# Patient Record
Sex: MALE | Race: WHITE
[De-identification: names, ages, dates, MRNs, and addresses within clinical notes are randomized per-mention and may not be internally consistent; named-entity substitution may affect disease eponyms.]

---

## 2018-11-20 ENCOUNTER — HOSPITAL ENCOUNTER (OUTPATIENT)
Dept: HOSPITAL 47 - RADUSWWP | Age: 78
End: 2018-11-20
Attending: FAMILY MEDICINE
Payer: MEDICARE

## 2018-11-20 DIAGNOSIS — R60.0: Primary | ICD-10-CM

## 2018-11-20 PROCEDURE — 93970 EXTREMITY STUDY: CPT

## 2018-11-20 NOTE — US
EXAMINATION TYPE: US venous doppler duplex LE 

 

DATE OF EXAM: 11/20/2018 12:52 PM

 

COMPARISON: NONE

 

CLINICAL HISTORY: R60.0 Localized edema. Intermittent pain left thigh. Bilateral leg edema 

 

SIDE PERFORMED: Bilateral  

 

TECHNIQUE:  The lower extremity deep venous system is examined utilizing real time linear array sonog
dariela with graded compression, doppler sonography and color-flow sonography.

 

VESSELS IMAGED:

External Iliac Vein (EIV)

Common Femoral Vein

Deep Femoral Vein

Greater Saphenous Vein *

Femoral Vein

Popliteal Vein

Small Saphenous Vein *

Proximal Calf Veins

(* superficial vessels)

 

Grayscale, color doppler, spectral doppler imaging performed of the deep veins of the lower extremiti
es.  There is normal flow, compressibility, vascular waveforms.

 

Right Leg:  No evidence of DVT

 

Left Leg:  No evidence of DVT

 

 

 

IMPRESSION:  No sonographic evidence of deep venous thrombosis within either lower extremity.

## 2020-06-22 ENCOUNTER — APPOINTMENT (OUTPATIENT)
Dept: URBAN - METROPOLITAN AREA CLINIC 234 | Age: 80
Setting detail: DERMATOLOGY
End: 2020-06-22

## 2020-06-22 DIAGNOSIS — L28.0 LICHEN SIMPLEX CHRONICUS: ICD-10-CM

## 2020-06-22 PROCEDURE — OTHER PRESCRIPTION: OTHER

## 2020-06-22 PROCEDURE — 99201: CPT

## 2020-06-22 PROCEDURE — OTHER COUNSELING: OTHER

## 2020-06-22 PROCEDURE — OTHER RECOMMENDATIONS: OTHER

## 2020-06-22 RX ORDER — BETAMETHASONE DIPROPIONATE 0.5 MG/G
CREAM TOPICAL
Qty: 1 | Refills: 1 | Status: ERX | COMMUNITY
Start: 2020-06-22

## 2020-06-22 NOTE — PROCEDURE: RECOMMENDATIONS
Recommendation Preamble: The following recommendations were made during the visit:
Recommendations (Free Text): Otc Sarna lotion
Detail Level: Simple

## 2020-07-27 ENCOUNTER — APPOINTMENT (OUTPATIENT)
Dept: URBAN - METROPOLITAN AREA CLINIC 234 | Age: 80
Setting detail: DERMATOLOGY
End: 2020-07-27

## 2020-07-27 DIAGNOSIS — D69.2 OTHER NONTHROMBOCYTOPENIC PURPURA: ICD-10-CM

## 2020-07-27 DIAGNOSIS — L28.0 LICHEN SIMPLEX CHRONICUS: ICD-10-CM

## 2020-07-27 PROCEDURE — OTHER COUNSELING: OTHER

## 2020-07-27 PROCEDURE — 99213 OFFICE O/P EST LOW 20 MIN: CPT

## 2020-07-27 ASSESSMENT — LOCATION ZONE DERM
LOCATION ZONE: LEG
LOCATION ZONE: ARM

## 2020-07-27 ASSESSMENT — LOCATION SIMPLE DESCRIPTION DERM
LOCATION SIMPLE: LEFT FOREARM
LOCATION SIMPLE: RIGHT THIGH

## 2020-07-27 ASSESSMENT — LOCATION DETAILED DESCRIPTION DERM
LOCATION DETAILED: LEFT PROXIMAL DORSAL FOREARM
LOCATION DETAILED: RIGHT ANTERIOR PROXIMAL THIGH

## 2021-06-01 ENCOUNTER — HOSPITAL ENCOUNTER (OUTPATIENT)
Dept: HOSPITAL 47 - RADUSWWP | Age: 81
Discharge: HOME | End: 2021-06-01
Attending: INTERNAL MEDICINE
Payer: MEDICARE

## 2021-06-01 DIAGNOSIS — N18.4: Primary | ICD-10-CM

## 2021-06-01 PROCEDURE — 76770 US EXAM ABDO BACK WALL COMP: CPT

## 2021-06-01 NOTE — US
EXAMINATION TYPE: US kidneys/renal and bladder

 

DATE OF EXAM: 6/1/2021

 

COMPARISON: NONE

 

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 CKD, no symptoms

 

EXAM MEASUREMENTS:

 

Right Kidney:  9.9 x 4.8 x 6.1 cm

Left Kidney: 10.2 x 4.4 x 5.3 cm

 

 

Right Kidney: cortical thinning noted  

Left Kidney: cortical thinning noted    

Bladder: wnl

**Bilateral Jets seen: left . Right ureteral jet is not visualized.

 

 

No hydronephrosis or shadowing renal calculi.

 

IMPRESSION:

 

1. No hydronephrosis or shadowing renal calculi.

2. Bilateral cortical renal thinning.

3. Only left ureteral jet is visualized. The right ureteral jet is not visualized.

## 2022-03-15 ENCOUNTER — HOSPITAL ENCOUNTER (OUTPATIENT)
Dept: HOSPITAL 47 - RADXRMAIN | Age: 82
Discharge: HOME | End: 2022-03-15
Payer: MEDICARE

## 2022-03-15 DIAGNOSIS — M43.16: ICD-10-CM

## 2022-03-15 DIAGNOSIS — M25.761: ICD-10-CM

## 2022-03-15 DIAGNOSIS — M25.861: Primary | ICD-10-CM

## 2022-03-15 DIAGNOSIS — M47.26: ICD-10-CM

## 2022-03-15 DIAGNOSIS — M51.16: ICD-10-CM

## 2022-03-15 PROCEDURE — 72100 X-RAY EXAM L-S SPINE 2/3 VWS: CPT

## 2022-03-15 NOTE — XR
EXAMINATION TYPE: XR knee complete bilateral

 

DATE OF EXAM: 3/15/2022

 

CLINICAL HISTORY: Injury one year ago with pain and numbness

 

TECHNIQUE:  Three views of the right knee are obtained.

 

COMPARISON: None.

 

FINDINGS:  There is no acute fracture/dislocation evident in right knee. Moderate narrowing and spurr
ing medial tibiofemoral compartment. Mild to moderate narrowing and mild spurring patellofemoral comp
artment. Overlying soft tissue is unremarkable.

 

IMPRESSION: As above.

## 2022-03-15 NOTE — XR
EXAMINATION TYPE: XR lumbar spine 3V

 

DATE OF EXAM: 3/15/2022

 

Comparison: None

 

Clinical History: 81-year-old male M54.16, M25.361

 

Findings:

5 lumbar type vertebral bodies. Hypertrophic facet arthropathy throughout the lumbar spine. Mild mult
ilevel degenerative disc disease, more moderate at T12-L1. Bridging or nearly bridging anterior plate
 spondylosis L2-L4 levels. There is trace grade 1 retrolisthesis L3-L4 and L4-L5. Remaining alignment
 is maintained. Vertebral body heights are preserved.

 

 

Impression:

 

1. Hypertrophic facet arthropathy throughout. Trace grade 1 retrolisthesis L3-L4 and L4-L5.

2. Bridging or nearly bridging anterior endplate spondylosis L2 through L4 levels.

3. Multilevel mild degenerative disc disease. No vertebral compression collapse.

## 2022-10-25 ENCOUNTER — HOSPITAL ENCOUNTER (OUTPATIENT)
Dept: HOSPITAL 47 - EC | Age: 82
Setting detail: OBSERVATION
LOS: 1 days | Discharge: LEFT BEFORE DISCHARGE | End: 2022-10-26
Attending: HOSPITALIST | Admitting: HOSPITALIST
Payer: MEDICARE

## 2022-10-25 ENCOUNTER — HOSPITAL ENCOUNTER (OUTPATIENT)
Dept: HOSPITAL 47 - RADXRMAIN | Age: 82
Discharge: HOME | End: 2022-10-25
Payer: MEDICARE

## 2022-10-25 VITALS — TEMPERATURE: 97.9 F

## 2022-10-25 DIAGNOSIS — M50.30: Primary | ICD-10-CM

## 2022-10-25 DIAGNOSIS — M43.16: ICD-10-CM

## 2022-10-25 DIAGNOSIS — Z79.4: ICD-10-CM

## 2022-10-25 DIAGNOSIS — N17.9: ICD-10-CM

## 2022-10-25 DIAGNOSIS — E11.9: ICD-10-CM

## 2022-10-25 DIAGNOSIS — M51.34: ICD-10-CM

## 2022-10-25 DIAGNOSIS — E78.5: ICD-10-CM

## 2022-10-25 DIAGNOSIS — M51.36: ICD-10-CM

## 2022-10-25 DIAGNOSIS — Z53.29: ICD-10-CM

## 2022-10-25 DIAGNOSIS — Z20.822: ICD-10-CM

## 2022-10-25 DIAGNOSIS — Z79.82: ICD-10-CM

## 2022-10-25 DIAGNOSIS — R55: Primary | ICD-10-CM

## 2022-10-25 DIAGNOSIS — I10: ICD-10-CM

## 2022-10-25 DIAGNOSIS — I95.9: ICD-10-CM

## 2022-10-25 DIAGNOSIS — Z87.891: ICD-10-CM

## 2022-10-25 DIAGNOSIS — E86.0: ICD-10-CM

## 2022-10-25 DIAGNOSIS — Z79.899: ICD-10-CM

## 2022-10-25 DIAGNOSIS — Z79.890: ICD-10-CM

## 2022-10-25 DIAGNOSIS — K21.9: ICD-10-CM

## 2022-10-25 LAB
ANION GAP SERPL CALC-SCNC: 16 MMOL/L
BASOPHILS # BLD AUTO: 0 K/UL (ref 0–0.2)
BASOPHILS NFR BLD AUTO: 0 %
BUN SERPL-SCNC: 71 MG/DL (ref 9–20)
CALCIUM SPEC-MCNC: 8.5 MG/DL (ref 8.4–10.2)
CHLORIDE SERPL-SCNC: 102 MMOL/L (ref 98–107)
CO2 SERPL-SCNC: 23 MMOL/L (ref 22–30)
EOSINOPHIL # BLD AUTO: 0.3 K/UL (ref 0–0.7)
EOSINOPHIL NFR BLD AUTO: 5 %
ERYTHROCYTE [DISTWIDTH] IN BLOOD BY AUTOMATED COUNT: 3.97 M/UL (ref 4.3–5.9)
ERYTHROCYTE [DISTWIDTH] IN BLOOD: 13.8 % (ref 11.5–15.5)
GLUCOSE SERPL-MCNC: 138 MG/DL (ref 74–99)
HCT VFR BLD AUTO: 36.9 % (ref 39–53)
HGB BLD-MCNC: 12.9 GM/DL (ref 13–17.5)
LYMPHOCYTES # SPEC AUTO: 0.8 K/UL (ref 1–4.8)
LYMPHOCYTES NFR SPEC AUTO: 13 %
MCH RBC QN AUTO: 32.4 PG (ref 25–35)
MCHC RBC AUTO-ENTMCNC: 34.9 G/DL (ref 31–37)
MCV RBC AUTO: 92.8 FL (ref 80–100)
MONOCYTES # BLD AUTO: 0.3 K/UL (ref 0–1)
MONOCYTES NFR BLD AUTO: 5 %
NEUTROPHILS # BLD AUTO: 4.6 K/UL (ref 1.3–7.7)
NEUTROPHILS NFR BLD AUTO: 76 %
PLATELET # BLD AUTO: 101 K/UL (ref 150–450)
POTASSIUM SERPL-SCNC: 5 MMOL/L (ref 3.5–5.1)
SODIUM SERPL-SCNC: 141 MMOL/L (ref 137–145)
WBC # BLD AUTO: 6 K/UL (ref 3.8–10.6)

## 2022-10-25 PROCEDURE — 93005 ELECTROCARDIOGRAM TRACING: CPT

## 2022-10-25 PROCEDURE — 85025 COMPLETE CBC W/AUTO DIFF WBC: CPT

## 2022-10-25 PROCEDURE — 72072 X-RAY EXAM THORAC SPINE 3VWS: CPT

## 2022-10-25 PROCEDURE — 99285 EMERGENCY DEPT VISIT HI MDM: CPT

## 2022-10-25 PROCEDURE — 72100 X-RAY EXAM L-S SPINE 2/3 VWS: CPT

## 2022-10-25 PROCEDURE — 96360 HYDRATION IV INFUSION INIT: CPT

## 2022-10-25 PROCEDURE — 83036 HEMOGLOBIN GLYCOSYLATED A1C: CPT

## 2022-10-25 PROCEDURE — 72050 X-RAY EXAM NECK SPINE 4/5VWS: CPT

## 2022-10-25 PROCEDURE — 80048 BASIC METABOLIC PNL TOTAL CA: CPT

## 2022-10-25 PROCEDURE — 87636 SARSCOV2 & INF A&B AMP PRB: CPT

## 2022-10-25 PROCEDURE — 36415 COLL VENOUS BLD VENIPUNCTURE: CPT

## 2022-10-25 RX ADMIN — CEFAZOLIN SCH MLS/HR: 330 INJECTION, POWDER, FOR SOLUTION INTRAMUSCULAR; INTRAVENOUS at 19:34

## 2022-10-25 NOTE — XR
EXAMINATION TYPE: XR thoracic spine complete

 

DATE OF EXAM: 10/25/2022 2:45 PM

 

INDICATION: 

Patient age:Male;  81 years old; 

Reason for study: M51.35 Other intervertebral disc degeneration; PHH. 

 

COMPARISON: None

 

TECHNIQUE: 3 views of the thoracic spine in frontal, lateral, swimmer's projections. 

 

FINDINGS: 

No evidence of acute fracture.  Minimal dextrocurvature of the upper thoracic spine. No vertebral bod
y height loss. Multilevel degenerative changes of the visualized spine with disc, endplate sclerosis,
 and anterior osteophytosis. No spondylolisthesis.

 

IMPRESSION: 

1.  No acute osseous pathology.

2.  Mild degenerative disc disease.

## 2022-10-25 NOTE — ED
General Adult HPI





- General


Chief complaint: Dizziness


Stated complaint: syncope


Time Seen by Provider: 10/25/22 14:57


Source: patient, RN notes reviewed


Mode of arrival: EMS


Limitations: no limitations





- History of Present Illness


Initial comments: 








Dictation was produced using dragon dictation software. please excuse any 

grammatical, word or spelling errors. 











Chief Complaint: 81-year-old male presents to emergency department after 

presyncope





History of Present Illness: Patient is an 81-year-old diabetic male presents 

emergency department for episode of presyncope.  Patient had x-rays ordered by 

his primary care doctor in order to qualify for spinal injections to be 

performed by pain specialist.  Patient states he felt fine all day.  He lay down

for the x-rays.  He stood up rather quickly and started to feel little 

lightheaded.  X-ray techs allegedly witnessed the eventthat his blood pressure 

dropped slightly.  Patient denies any loss of consciousness.  He did have some 

episodes of nausea vomiting.  Patient states that since being in emergency 

department he feels fine.  His metabolic baseline.  His caretakers at the 

bedside states that patient's family member tested positive for COVID-19 

recently.  Patient denies any history of cardiac myopathy or cardiac disease.








The ROS documented in this emergency department record has been reviewed and 

confirmed by me.  Those systems with pertinent positive or negative responses 

have been documented in the HPI.  All other systems are other negative and/or no

ncontributory.








PHYSICAL EXAM:


General Impression: Alert and oriented x3, not in acute distress


HEENT: Normocephalic atraumatic, extra-ocular movements intact, pupils equal and

reactive to light bilaterally, mucous membranes moist.


Cardiovascular: Heart regular rate and rhythm


Chest: Able to complete full sentences, no retractions, no tachypnea


Abdomen: abdomen soft, non-tender, non-distended, no organomegaly


Musculoskeletal: Pulses present and equal in all extremities, no peripheral 

edema


Motor:  no focal deficits noted


Neurological: CN II-XII grossly intact, no focal motor or sensory deficits noted


Skin: Intact with no visualized rashes


Psych: Normal affect and mood





ED course: 81-year-old male presents emergency department for presyncopal 

episode.  Vital signs upon arrival are within acceptable limits.  Patient does 

however have a soft blood pressure for someone who has hypertension and takes 

multiple antihypertensive medications.  He is unremarkable.





Laboratory evaluation obtained.  CBC unremarkable.  Metabolic panel shows 

creatinine 3.7 with a BUN of 71.  Clinical presentation concerning for acute 

kidney injury for panel viral PCR is negative.  Patient observed in emergency 

Department with improvement of blood pressure.  Disposition options were 

discussed with patient.  Patient is agreeable for observation admission for IV 

fluids.  Patient's acute kidney injury, presyncope and hypotension likely 

secondary to dehydration.  Patient's son at the bedside states that patient has 

poor oral intake since the passing of their family member.  





EKG interpretation: Ventricular rate 52, sinus bradycardia, WA interval 217, QS 

119, QTC 39. No WA prolongation, no QTC prolongation, no ST or T-wave changes 

noted.   Overall, this EKG is unremarkable











- Related Data


                                Home Medications











 Medication  Instructions  Recorded  Confirmed


 


Labetalol [Trandate] 200 mg PO BID 08/02/18 10/25/22


 


Levothyroxine Sodium [Tirosint] 125 mcg PO DAILY 08/02/18 10/25/22


 


Omeprazole 20 mg PO DAILY 08/02/18 10/25/22


 


Torsemide [Demadex] 20 mg PO BID 08/02/18 10/25/22


 


calcitrioL [Rocaltrol] 0.25 mcg PO MOTUWETHFRSA 08/02/18 10/25/22


 


Aspirin EC [Ecotrin Low Dose] 81 mg PO DAILY 10/25/22 10/25/22


 


Imiquimod [Aldara] 1 packet TOPICAL Q72H 10/25/22 10/25/22


 


Insulin Aspart [NovoLOG Flexpen] 3 units SQ TID-W/MEALS PRN 10/25/22 10/25/22


 


Insulin Aspart [NovoLOG Flexpen] See Protocol SQ TID-W/MEALS PRN 10/25/22 

10/25/22


 


Insulin Glargine,Hum.rec.anlog 10 units SQ DAILY PRN 10/25/22 10/25/22





[Lantus Solostar Pen]   


 


Latanoprost [Latanoprost 0.005%] 1 drop BOTH EYES HS 10/25/22 10/25/22


 


Multivitamins, Thera [Multivitamin 1 tab PO DAILY 10/25/22 10/25/22





(formulary)]   


 


NIFEdipine XL [Procardia XL] 60 mg PO DAILY 10/25/22 10/25/22


 


Omega-3/Dha/Epa/Fish Oil [Fish Oil 1 cap PO DAILY 10/25/22 10/25/22





1,000 mg Softgel]   


 


Sodium Bicarbonate Tab 650 mg PO TID 10/25/22 10/25/22


 


Terazosin HCl 10 mg PO HS 10/25/22 10/25/22


 


lisinopriL [Zestril] 5 mg PO BID 10/25/22 10/25/22











                                    Allergies











Allergy/AdvReac Type Severity Reaction Status Date / Time


 


No Known Allergies Allergy   Verified 10/25/22 16:17














Review of Systems


ROS Statement: 


Those systems with pertinent positive or pertinent negative responses have been 

documented in the HPI.





ROS Other: All systems not noted in ROS Statement are negative.





Past Medical History


Past Medical History: Diabetes Mellitus, GERD/Reflux, Hyperlipidemia, 

Hypertension, Osteoarthritis (OA), Prostate Disorder, Renal Disease


Additional Past Medical History / Comment(s): Thrombocytopenic disorder


History of Any Multi-Drug Resistant Organisms: None Reported


Additional Past Surgical History / Comment(s): skin biopsy left shoulder


Past Anesthesia/Blood Transfusion Reactions: No Reported Reaction


Past Psychological History: No Psychological Hx Reported


Smoking Status: Former smoker


Past Alcohol Use History: Occasional


Past Drug Use History: None Reported





General Exam


Limitations: no limitations





Course


                                   Vital Signs











  10/25/22 10/25/22





  14:45 16:02


 


Temperature 97.9 F 


 


Pulse Rate 58 L 59 L


 


Respiratory 18 18





Rate  


 


Blood Pressure 92/49 124/49


 


O2 Sat by Pulse 98 96





Oximetry  














Medical Decision Making





- Lab Data


Result diagrams: 


                                 10/25/22 15:17





                                 10/25/22 15:17


                                   Lab Results











  10/25/22 10/25/22 10/25/22 Range/Units





  15:17 15:17 15:28 


 


WBC  6.0    (3.8-10.6)  k/uL


 


RBC  3.97 L    (4.30-5.90)  m/uL


 


Hgb  12.9 L    (13.0-17.5)  gm/dL


 


Hct  36.9 L    (39.0-53.0)  %


 


MCV  92.8    (80.0-100.0)  fL


 


MCH  32.4    (25.0-35.0)  pg


 


MCHC  34.9    (31.0-37.0)  g/dL


 


RDW  13.8    (11.5-15.5)  %


 


Plt Count  101 L    (150-450)  k/uL


 


MPV  9.4    


 


Neutrophils %  76    %


 


Lymphocytes %  13    %


 


Monocytes %  5    %


 


Eosinophils %  5    %


 


Basophils %  0    %


 


Neutrophils #  4.6    (1.3-7.7)  k/uL


 


Lymphocytes #  0.8 L    (1.0-4.8)  k/uL


 


Monocytes #  0.3    (0-1.0)  k/uL


 


Eosinophils #  0.3    (0-0.7)  k/uL


 


Basophils #  0.0    (0-0.2)  k/uL


 


Sodium   141   (137-145)  mmol/L


 


Potassium   5.0   (3.5-5.1)  mmol/L


 


Chloride   102   ()  mmol/L


 


Carbon Dioxide   23   (22-30)  mmol/L


 


Anion Gap   16   mmol/L


 


BUN   71 H   (9-20)  mg/dL


 


Creatinine   3.27 H   (0.66-1.25)  mg/dL


 


Est GFR (CKD-EPI)AfAm   20   (>60 ml/min/1.73 sqM)  


 


Est GFR (CKD-EPI)NonAf   17   (>60 ml/min/1.73 sqM)  


 


Glucose   138 H   (74-99)  mg/dL


 


Calcium   8.5   (8.4-10.2)  mg/dL


 


Influenza Type A (PCR)    Not Detected  (Not Detectd)  


 


Influenza Type B (PCR)    Not Detected  (Not Detectd)  


 


RSV (PCR)    Not Detected  (Not Detectd)  


 


SARS-CoV-2 (PCR)    Not Detected  (Not Detectd)  














Disposition


Clinical Impression: 


 Dehydration, Hypotension, MONY (acute kidney injury)





Disposition: ADMITTED AS IP TO THIS \[facility]\""


Condition: Fair


Referrals: 


Shen Hickman MD [Primary Care Provider] - 1-2 days


Decision Time: 17:08

## 2022-10-25 NOTE — XR
EXAMINATION TYPE: XR lumbar spine 2 or 3V

 

DATE OF EXAM: 10/25/2022

 

CLINICAL HISTORY: M51.35 Other intervertebral disc degeneration

 

TECHNIQUE: Three views of the lumbar spine are submitted.

 

COMPARISON: Lumbar spine radiograph 3/15/2022.

 

FINDINGS:  

There are 5 lumbar type vertebral bodies identified. No acute fracture or dislocation. Vertebral body
 heights are maintained. Mild multilevel degenerative disc disease. This again most pronounced at T12
-L1. Multilevel facet arthropathy. Similar anterior bridging osteophyte at L2-L4 levels. Trace retrol
isthesis of L3 on L4 and L4-L5. The overlying soft tissue appears unremarkable. Vascular sclerosis.

 

IMPRESSION:  

1.  No acute fracture or dislocation is seen in the lumbar spine.

2.  Mild multilevel degenerative disc disease.

3.  Trace retrolisthesis of L3 on L4 and L4-L5.

## 2022-10-25 NOTE — XR
EXAMINATION TYPE: XR cervical spine comp

 

DATE OF EXAM: 10/25/2022 2:45 PM

 

INDICATION: 

Patient age:Male;  81 years old; 

Reason for study: M51.35 Other intervertebral disc degeneration; PHH. 

 

COMPARISON: None

 

TECHNIQUE: The cervical spine was imaged in 4 projections. Frontal, lateral, odontoid and bilateral o
blique.

 

FINDINGS:

The osseous structures show normal alignment without evidence of an acute fracture. There are osteoph
ytes noted throughout the cervical spine on the anterior and lateral aspects of the vertebral bodies.
 2 level intervertebral disc space narrowing.  Multilevel facet arthropathy. Pedicles are intact.  So
ft tissues are within normal limits. The odontoid appears intact.

 

IMPRESSION: 

1. No fracture or dislocation.

 

2. Moderate degenerative disc disease changes of the cervical spine.

## 2022-10-26 VITALS — RESPIRATION RATE: 18 BRPM | HEART RATE: 82 BPM | SYSTOLIC BLOOD PRESSURE: 155 MMHG | DIASTOLIC BLOOD PRESSURE: 81 MMHG

## 2022-10-26 LAB
ANION GAP SERPL CALC-SCNC: 12.1 MMOL/L (ref 10–18)
BUN SERPL-SCNC: 62.2 MG/DL (ref 9–27)
BUN/CREAT SERPL: 22.21 RATIO (ref 12–20)
CALCIUM SPEC-MCNC: 8.1 MG/DL (ref 8.7–10.3)
CHLORIDE SERPL-SCNC: 106 MMOL/L (ref 96–109)
CO2 SERPL-SCNC: 24.9 MMOL/L (ref 20–27.5)
GLUCOSE SERPL-MCNC: 103 MG/DL (ref 70–110)
POTASSIUM SERPL-SCNC: 4.7 MMOL/L (ref 3.5–5.5)
SODIUM SERPL-SCNC: 143 MMOL/L (ref 135–145)

## 2022-10-26 RX ADMIN — CEFAZOLIN SCH: 330 INJECTION, POWDER, FOR SOLUTION INTRAMUSCULAR; INTRAVENOUS at 07:37

## 2022-10-26 NOTE — P.DS
Providers


Date of admission: 


10/25/22 17:05





Attending physician: 


Dwain Fowler





Primary care physician: 


Shen Hickman





[facility] Course: 


Patient admitted yesterday evening to medical team. Remained in ER as a hold for

medical bed. Notified at 0630 10/26/2022 that patient wanted to leave AMA. 

Attempted to call nurse. No answer. Called nurse back and notified that patient 

left AMA at 0820 prior to being evaluated by medical team. 





Patient was admitted for acute kidney injury which appears to have improved with

IV fluids overnight. Creatinine 2.6 on discharge per medical record. 





The impression and plan of care has been dictated by Rossana Wayne Nurse 

Practitioner as directed.





Dr. Lucien MD


I have performed a history and physical examination and medical decision making 

of this patient, discussed the same with the dictator, and agree with the 

dictators assessment and plan as written, documented as a scribe. Based on total

visit time, I have performed more than 50% of this visit. 





Patient Condition at Discharge: Undetermined





Plan - Discharge Summary


New Discharge Prescriptions: 


No Action


   Torsemide [Demadex] 20 mg PO BID


   Omeprazole 20 mg PO DAILY


   Levothyroxine Sodium [Tirosint] 125 mcg PO DAILY


   Labetalol [Trandate] 200 mg PO BID


   calcitrioL [Rocaltrol] 0.25 mcg PO MOTUWETHFRSA


   Multivitamins, Thera [Multivitamin (formulary)] 1 tab PO DAILY


   Insulin Glargine,Hum.rec.anlog [Lantus Solostar Pen] 10 units SQ DAILY PRN


     PRN Reason: high blood sugar


   Terazosin HCl 10 mg PO HS


   Insulin Aspart [NovoLOG Flexpen] 3 units SQ TID-W/MEALS PRN


     PRN Reason: high blood sugar


   NIFEdipine XL [Procardia XL] 60 mg PO DAILY


   lisinopriL [Zestril] 5 mg PO BID


   Latanoprost [Latanoprost 0.005%] 1 drop BOTH EYES HS


   Aspirin EC [Ecotrin Low Dose] 81 mg PO DAILY


   Imiquimod [Aldara] 1 packet TOPICAL Q72H


   Omega-3/Dha/Epa/Fish Oil [Fish Oil 1,000 mg Softgel] 1 cap PO DAILY


   Insulin Aspart [NovoLOG Flexpen] See Protocol SQ TID-W/MEALS PRN


     PRN Reason: high blood sugar


   Sodium Bicarbonate Tab 650 mg PO TID


Discharge Medication List





Labetalol [Trandate] 200 mg PO BID 08/02/18 [History]


Levothyroxine Sodium [Tirosint] 125 mcg PO DAILY 08/02/18 [History]


Omeprazole 20 mg PO DAILY 08/02/18 [History]


Torsemide [Demadex] 20 mg PO BID 08/02/18 [History]


calcitrioL [Rocaltrol] 0.25 mcg PO MOTUWETHFRSA 08/02/18 [History]


Aspirin EC [Ecotrin Low Dose] 81 mg PO DAILY 10/25/22 [History]


Imiquimod [Aldara] 1 packet TOPICAL Q72H 10/25/22 [History]


Insulin Aspart [NovoLOG Flexpen] 3 units SQ TID-W/MEALS PRN 10/25/22 [History]


Insulin Aspart [NovoLOG Flexpen] See Protocol SQ TID-W/MEALS PRN 10/25/22 

[History]


Insulin Glargine,Hum.rec.anlog [Lantus Solostar Pen] 10 units SQ DAILY PRN 

10/25/22 [History]


Latanoprost [Latanoprost 0.005%] 1 drop BOTH EYES HS 10/25/22 [History]


Multivitamins, Thera [Multivitamin (formulary)] 1 tab PO DAILY 10/25/22 

[History]


NIFEdipine XL [Procardia XL] 60 mg PO DAILY 10/25/22 [History]


Omega-3/Dha/Epa/Fish Oil [Fish Oil 1,000 mg Softgel] 1 cap PO DAILY 10/25/22 

[History]


Sodium Bicarbonate Tab 650 mg PO TID 10/25/22 [History]


Terazosin HCl 10 mg PO HS 10/25/22 [History]


lisinopriL [Zestril] 5 mg PO BID 10/25/22 [History]








Follow up Appointment(s)/Referral(s): 


Shen Hickman MD [Primary Care Provider] - 1-2 days


Discharge Disposition: Left Against Medical Advice